# Patient Record
Sex: MALE
[De-identification: names, ages, dates, MRNs, and addresses within clinical notes are randomized per-mention and may not be internally consistent; named-entity substitution may affect disease eponyms.]

---

## 2019-07-26 PROBLEM — Z00.00 ENCOUNTER FOR PREVENTIVE HEALTH EXAMINATION: Status: ACTIVE | Noted: 2019-07-26

## 2019-10-24 ENCOUNTER — APPOINTMENT (OUTPATIENT)
Dept: ORTHOPEDIC SURGERY | Facility: CLINIC | Age: 59
End: 2019-10-24
Payer: COMMERCIAL

## 2019-10-24 VITALS — HEIGHT: 72 IN | BODY MASS INDEX: 31.15 KG/M2 | WEIGHT: 230 LBS

## 2019-10-24 DIAGNOSIS — M25.571 PAIN IN RIGHT ANKLE AND JOINTS OF RIGHT FOOT: ICD-10-CM

## 2019-10-24 PROCEDURE — 99214 OFFICE O/P EST MOD 30 MIN: CPT

## 2019-10-24 PROCEDURE — 73610 X-RAY EXAM OF ANKLE: CPT | Mod: RT

## 2019-10-24 NOTE — DISCUSSION/SUMMARY
[de-identified] : This patient seems to have some mild instability of his right ankle with some swelling. I recommended getting an MRI. Recommendations will be made further upon review of the MRI. I want to make sure he is not developing any arthritis inside of his ankle.

## 2019-10-24 NOTE — HISTORY OF PRESENT ILLNESS
[FreeTextEntry1] : This patient was last seen about one year ago for his ankle. He does have pain in his right ankle he just has swelling he has no buckling or giving way. He can function due to normal day-to-day things.

## 2019-10-24 NOTE — REVIEW OF SYSTEMS
[Arthralgia] : no arthralgia [Joint Pain] : no joint pain [Joint Stiffness] : no joint stiffness [Joint Swelling] : joint swelling [Negative] : Heme/Lymph

## 2019-10-24 NOTE — PHYSICAL EXAM
[de-identified] : Right ankle shows mild swelling. There is minimal tenderness of the lateral side of his ankle he does have a 1+ anterior drawer test. His strength is 5/5 neurovascular exam is normal full range of motion. No medial sided pain